# Patient Record
Sex: FEMALE | Race: WHITE | Employment: FULL TIME | URBAN - METROPOLITAN AREA
[De-identification: names, ages, dates, MRNs, and addresses within clinical notes are randomized per-mention and may not be internally consistent; named-entity substitution may affect disease eponyms.]

---

## 2018-03-15 ENCOUNTER — HOSPITAL ENCOUNTER (OUTPATIENT)
Dept: MAMMOGRAPHY | Age: 58
Discharge: HOME | End: 2018-03-15
Attending: OBSTETRICS & GYNECOLOGY | Admitting: OBSTETRICS & GYNECOLOGY

## 2018-03-15 DIAGNOSIS — Z12.31 VISIT FOR SCREENING MAMMOGRAM: ICD-10-CM

## 2018-03-23 ENCOUNTER — HOSPITAL ENCOUNTER (OUTPATIENT)
Dept: MAMMOGRAPHY | Age: 58
Discharge: HOME | End: 2018-03-23
Admitting: OBSTETRICS & GYNECOLOGY

## 2018-03-23 DIAGNOSIS — R92.8 ABNORMAL MAMMOGRAM: ICD-10-CM

## 2019-05-29 ENCOUNTER — HOSPITAL ENCOUNTER (OUTPATIENT)
Dept: WOMENS IMAGING | Age: 59
Discharge: HOME | End: 2019-05-29
Payer: COMMERCIAL

## 2019-05-29 DIAGNOSIS — Z12.39 BREAST CANCER SCREENING: ICD-10-CM

## 2019-05-29 PROCEDURE — 77067 SCR MAMMO BI INCL CAD: CPT

## 2021-04-07 ENCOUNTER — IMMUNIZATION (OUTPATIENT)
Dept: PRIMARY CARE CLINIC | Age: 61
End: 2021-04-07
Payer: COMMERCIAL

## 2021-04-07 PROCEDURE — 91300 COVID-19, PFIZER VACCINE 30MCG/0.3ML DOSE: CPT | Performed by: FAMILY MEDICINE

## 2021-04-07 PROCEDURE — 0001A COVID-19, PFIZER VACCINE 30MCG/0.3ML DOSE: CPT | Performed by: FAMILY MEDICINE

## 2021-04-28 ENCOUNTER — IMMUNIZATION (OUTPATIENT)
Dept: PRIMARY CARE CLINIC | Age: 61
End: 2021-04-28
Payer: COMMERCIAL

## 2021-04-28 PROCEDURE — 91300 COVID-19, PFIZER VACCINE 30MCG/0.3ML DOSE: CPT | Performed by: FAMILY MEDICINE

## 2021-04-28 PROCEDURE — 0002A COVID-19, PFIZER VACCINE 30MCG/0.3ML DOSE: CPT | Performed by: FAMILY MEDICINE

## 2021-12-27 ENCOUNTER — HOSPITAL ENCOUNTER (OUTPATIENT)
Dept: WOMENS IMAGING | Age: 61
Discharge: HOME | End: 2021-12-27
Payer: COMMERCIAL

## 2021-12-27 VITALS — BODY MASS INDEX: 23.7 KG/M2 | HEIGHT: 69 IN | WEIGHT: 160 LBS

## 2021-12-27 DIAGNOSIS — Z12.31 VISIT FOR SCREENING MAMMOGRAM: ICD-10-CM

## 2021-12-27 PROCEDURE — 77067 SCR MAMMO BI INCL CAD: CPT

## 2022-05-14 ENCOUNTER — HOSPITAL ENCOUNTER (OUTPATIENT)
Dept: NON INVASIVE DIAGNOSTICS | Age: 62
Discharge: HOME | End: 2022-05-14
Payer: COMMERCIAL

## 2022-05-14 DIAGNOSIS — I31.39 PERICARDIAL EFFUSION (NONINFLAMMATORY): ICD-10-CM

## 2022-05-14 LAB
LV EF: 55 %
LVEF MODALITY: NORMAL

## 2022-05-14 PROCEDURE — 93306 TTE W/DOPPLER COMPLETE: CPT

## 2022-05-17 ENCOUNTER — HOSPITAL ENCOUNTER (OUTPATIENT)
Dept: ULTRASOUND IMAGING | Age: 62
Discharge: HOME | End: 2022-05-17
Payer: COMMERCIAL

## 2022-05-17 DIAGNOSIS — R16.0 LIVER MASS: ICD-10-CM

## 2022-05-17 PROCEDURE — 76705 ECHO EXAM OF ABDOMEN: CPT

## 2022-12-08 ENCOUNTER — APPOINTMENT (OUTPATIENT)
Dept: PHYSICAL THERAPY | Age: 62
End: 2022-12-08
Payer: COMMERCIAL

## 2022-12-20 ENCOUNTER — HOSPITAL ENCOUNTER (OUTPATIENT)
Dept: PHYSICAL THERAPY | Age: 62
Setting detail: THERAPIES SERIES
Discharge: HOME | End: 2022-12-20
Payer: COMMERCIAL

## 2022-12-20 PROCEDURE — 97161 PT EVAL LOW COMPLEX 20 MIN: CPT

## 2022-12-20 PROCEDURE — 97530 THERAPEUTIC ACTIVITIES: CPT

## 2022-12-20 RX ORDER — LEVOTHYROXINE SODIUM 0.03 MG/1
25 TABLET ORAL DAILY
COMMUNITY

## 2022-12-20 NOTE — FLOWSHEET NOTE
[facility], [facility],  Elana Castellanos, [address]  Phone: [phone]  Fax [phone]      Physical Therapy Daily Treatment Note    Date:  2022    Patient Name:  Matteo Jones    :  1960  MRN: [id number]    Restrictions/Precautions:  universal   Allergies: Patient has no known allergies. Preferred Language for Healthcare:   [x] English       [] other:    Medical Diagnosis Information:  Diagnosis: M79.18 (ICD-10-CM) - Myalgia, other site  N94.10 (ICD-10-CM) - Unspecified dyspareunia  Treatment Diagnosis:pelvic floor hyperactivity                                          Insurance/Certification information:   [employer]na Medicare   Physician Information:  Bora Perez MD  Plan of care signed (Y/N):  N    Visit# / total visits:         Functional Outcome (if applicable): FOTO:54/300 (Lower score = Better function)     Medicare Cap (if applicable):  N/a= total amount used, updated 2022    Time in:   4:15pm      Timed Treatment: 45min Total Treatment Time:  60min  ________________________________________________________________________________________    Pain Level:    /10  SUBJECTIVE:  see eval    OBJECTIVE:     Exercise (TE) Resistance/Repetitions Other comments            PF strengthening                                  PF relaxation                                   Other Treatment:   TA:  Bladder re-training/education:     Bladder Diary: patient educated on importance of filling out bladder diary at home, complete with fluid intake, voids, and leakage when applicable. Voiding: patient educated on normal voiding and urinary cycle and the physiology of bladder control muscles and pelvic floor. The patient was educated on bladder and bowel dysfunction. The patient was also educated on hyperactivity  and it's affect on urination/defecation and pain.     Dietary:   Other: General education regarding anatomy and physiology of pelvic floor and supporting structures    Manual Treatments:    PERFECT, myofacial release, tapping to improve contraction      Modalities:  --    Test/Measurements:  see eval             ASSESSMENT:   see eval      Treatment/Activity Tolerance:   [x] Patient tolerated treatment well [] Patient limited by fatique  [] Patient limited by pain [] Patient limited by other medical complications  [] Other:     Goals:      Patient stated goal: \"normal sexual relations\"  [] Progressing: [] Met: [] Not Met: [] Adjusted        Therapist goals for Patient:   Short Term Goals: To be achieved in: 2 weeks  1. Independent in HEP and progression per patient tolerance, in order to prevent future occurrence of presenting issue. [] Progressing: [] Met: [] Not Met: [] Adjusted  2. Patient will have a 25% decrease in pain to indicate improvement in pelvic floor strength and relaxation, muscle coordination, and/or bladder retraining. [] Progressing: [] Met: [] Not Met: [] Adjusted     Long Term Goals: To be achieved in: 12 weeks  1. Disability index score of 25 or less for the PFDI-20 to assist with reaching prior level of function. [] Progressing: [] Met: [] Not Met: [] Adjusted  2. Patient will report ability to tolerate penetration without increase in pain to progress towards intercourse / examinations without pain or limitation. [] Progressing: [] Met: [] Not Met: [] Adjusted  3. Patient will increase PERF score to 4/5 with endurance of 10 seconds to demonstrate increased strength and control over pelvic floor musculature. [] Progressing: [] Met: [] Not Met: [] Adjusted  4. Patient will report 1 week or greater without s/s of constipation to progress towards completing ADLs and recreational activities without limitations.   [] Progressing: [] Met: [] Not Met: [] Adjusted          Plan: [x] Continue per plan of care [] Alter current plan (see comments)   [] Plan of care initiated [] Hold pending MD visit [] Discharge      Plan for Next Session:  review diary, TE, TA, manual PRN    Re-Certification Due Date:     visit 12    Signature:  Zofia Ko, PT, DPT

## 2022-12-20 NOTE — PROGRESS NOTES
[facility] Therapy, [facility], [address], [address]  Phone: [phone]  Fax [phone]                                                        Physical Therapy Certification    Dear Leodan Lara MD,    We had the pleasure of evaluating the following patient for physical therapy services at [address]. A summary of our findings can be found in the initial assessment below. This includes our plan of care. If you have any questions or concerns regarding these findings, please do not hesitate to contact me at the office phone number checked above. Thank you for the referral.       Physician Signature:_______________________________Date:__________________  By signing above (or electronic signature), therapist's plan is approved by physician      Patient: Sabina Hooks   : 1960   MRN: [id number]  Referring Physician: Leodan Lara MD      Evaluation Date: 2022      Medical Diagnosis Information:  Diagnosis: M79.18 (ICD-10-CM) - Myalgia, other site  N94.10 (ICD-10-CM) - Unspecified dyspareunia      Treatment Diagnosis:   pelvic floor hyperactivity                                        Insurance information:  A[employer]na Medicare     Chaperone offered for pelvic examination? [] No    [x] Yes  Chaperone requested for examination:  [x] No    [] Yes   If yes, who was present:    Precautions/ Contra-indications: universal     Latex Allergy:  [x]NO      []YES  Allergies: Patient has no known allergies. Preferred Language for Healthcare:   [x]English       []Other:    C-SSRS Triggered by Intake questionnaire (Past 2 wk assessment ):   [x] No, Questionnaire did not trigger screening.   [] Yes, Patient intake triggered C-SSRS Screening     [] Completed, no further action required.    [] Completed, PCP notified via Epic    Functional Outcome:   FOTO:54/300 (Lower score = Better function) SUBJECTIVE: Patient reports \"I have had painful intercourse/examinations for a while now\". Reports she has lichens sclerosis and has been using medications and feels that may have thinned the tissue some. Reports she also has some issues with emptying her bladder. Reports she does not have any urinary or fecal incontinence. Reports she does have some constipation. Reports \"I feel like it is a mental issue and physical issue\". Reports \"I feel still and I am not as stretched out as I used to be, in my legs and all of that\". Reports her pain has gradually gotten worse. Reports she has had pain for the last 5 years, but in the last 2-3 years she has had increased pain and problems. Reports she has dryness and tearing feeling in the vagina. Reports she has pain deep and \"feels like he is hitting a wall\". Has not had a hysterectomy. No history of sexual abuse or trauma. Urinary frequency? Daytime? YES Nighttime? YES  Bowel problems? Some constipation  Urinary or bowel leakage? denies    Pregnancy:   # of pregnancies: 1   # of deliveries: 1 vaginal    Episiotomy: yes   Normal post-osei healing? yes       4 week or greater of failed trial of PFPT program?   [x] No    [] Yes    PFPT program as defined by \"Completing 4 weeks of an ordered plan of pelvic muscle exercises designed to increase periurethral muscle strength\".     Relevant Medical History: see medical chart, reviewed and updated with patient    Pain Scale: 0-6/10  Easing factors: rest  Provocative factors: intercourse, examinations  Type: []Constant   [x]Intermittent  []Radiating []Localized []other:     Numbness/Tingling: denies    Occupation/School: Full Time       Living Status/Prior Level of Function: Prior to this injury / incident, pt was independent with ADLs and IADLs    OBJECTIVE:    Observation:   Posture: WFL   Gait analysis: WFL  Lumbar Mobility: WFL  Scar Location/Mobility: n/a  Diastasis Recti (in finger width): none noted    Special Tests:  Sacroiliac Test:   Gillet: positive (B)  Gaenslen: negative   Lumbar Spine:   Slump test: negative (B)    Neuro:   Sensation: (B) UEs:intact to light touch   Sensation: (B) LEs: intact to light touch   Anal Sensation:    S5: intact to light touch    S4: intact to light touch    S3:intact to light touch   Reflexes:     Anal: present    Cough: present      Pelvic Floor Exam  External:  Skin Condition: normal    Sensation: intact   Palpation: no point tenderness noted  Tone: normal   Perineal Body Mobility:    Voluntary PFM Contraction: present (normal)   Voluntary PFM Relaxation: present (normal)   Involuntary PFM Contraction/Cough Reflex: present (normal)   Involuntary PFM Relaxation: present (normal)  Perineal Body Descent:   Rest: supported   Bearing down: present (caudal) - ABNORMAL    Internal:  Sensation: intact   Palpation: (B) LA hyperactivity and moderate pain  Vaginal Vault Size: slightly decreased due to hyperactivity   PERFECT:   Power: 3+/5   Endurance: 8sec. Repetitions: 5   Fast Twitch: 5   Elevation: present   Co-contraction: present   Timing:present    Pelvic Organ Prolapse: none noted                         [x] Patient history, allergies, meds reviewed. Medical chart reviewed. See intake form. Review Of Systems (ROS):  [x]Performed Review of systems (Integumentary, CardioPulmonary, Neurological) by intake and observation. Intake form has been scanned into medical record. Patient has been instructed to contact their primary care physician regarding ROS issues if not already being addressed at this time.       Co-morbidities/Complexities (which will affect course of rehabilitation):   [x]None        []Hx of COVID   Arthritic conditions   []Rheumatoid arthritis (M05.9)  []Osteoarthritis (M19.91)  []Gout   Cardiovascular conditions   []Hypertension (I10)  []Hyperlipidemia (E78.5)  []Angina pectoris (I20)  []Atherosclerosis (I70)  []Pacemaker  []Hx of CABG/stent/  cardiac surgeries   Musculoskeletal conditions   []Disc pathology   []Congenital spine pathologies   []Osteoporosis (M81.8)  []Osteopenia (M85.8)  []Scoliosis       Endocrine conditions   []Hypothyroid (E03.9)  []Hyperthyroid Gastrointestinal conditions   []Constipation (I16.40)   Metabolic conditions   []Morbid obesity (E66.01)  []Diabetes type 1(E10.65) or 2 (E11.65)   []Neuropathy (G60.9)     Cardio/Pulmonary conditions   []Asthma (J45)  []Coughing   []COPD (J44.9)  []CHF  []A-fib   Psychological Disorders  []Anxiety (F41.9)  []Depression (F32.9)   []Other:   Developmental Disorders  []Autism (F84.0)  []CP (G80)  []Down Syndrome (Q90.9)  []Developmental delay     Neurological conditions  []Prior Stroke (I69.30)  []Parkinson's (G20)  []Encephalopathy (G93.40)  []MS (G35)  []Post-polio (G14)  []SCI  []TBI  []ALS Other conditions  []Fibromyalgia (M79.7)  []Vertigo  []Syncope  []Kidney Failure  []Cancer      []currently undergoing                treatment  []Pregnancy  []Incontinence   Prior surgeries  []involved limb  []previous spinal surgery  [] section birth  []hysterectomy  []bowel / bladder surgery  []other relevant surgeries   []Other:              Barriers to/and or personal factors that will affect rehab potential: NONE                    Falls Risk Assessment (30 days):   [x] Falls Risk assessed and no intervention required. [] Falls Risk assessed and Patient requires intervention due to being higher risk   TUG score (>12s at risk):     [] Falls education provided, including         ASSESSMENT: The patient is a 64yo female referred to PFPT due to M79.18 (ICD-10-CM) - Myalgia, other site  N94.10 (ICD-10-CM) - Unspecified dyspareunia. The patient presented with decreased pelvic floor strength, endurance and overall coordination. The patient presented with hyperactivity of pelvic floor as well. The patient seems very motivated and should improve with PT and HEP compliance.     Functional Impairments:    []Noted lumbar/proximal hip hypomobility  []Noted lumbosacral and/or generalized hypermobility  []Decreased core/proximal hip strength and neuromuscular control   []Decreased LE functional strength  []pelvic/sacral/spinal malalignment   []Increased pain with penetration  []Absent/poor control of PF contraction   [x]Absent/poor control of PF relaxation  []Decreased control of bladder  []Decreased control of bowel    Functional Activity Limitations (from functional questionnaire and intake)  []Reduced ability to maintain good posture and demonstrate good body mechanics with sitting, bending, and lifting  []Reduced ability to perform lifting, reaching, carrying tasks  []Reduced ability to control urine  []Reduced ability to control bowel movements   []Reduced ability to ambulate prolonged functional periods/distances/surfaces  []Reduced ability to squat   []Reduced ability to forward bend  []Reduced ability to ascend/descend stairs  [x]Reduced ability to tolerate penetration/intercourse    Participation Restrictions  [x]Reduced participation in self care activities  [x]Reduced participation in home management activities  [x]Reduced participation in work activities  [x]Reduced participation in social activities  [x]Reduced participation in sport/recreational activities    Classification/Subgrouping:  [x]signs/symptoms consistent vaginismus/dyspareunia    []signs/symptoms consistent with pelvic floor organ prolapse  []signs/symptoms consistent with stress urinary incontinence  []signs/symptoms consistent with urge urinary incontinence  []signs/symptoms consistent with bowel incontinence  [x]signs/symptoms consistent with decreased ROM, strength and function  []signs/symptoms consistent with other:       Prognosis/Rehab Potential:      []Excellent   [x]Good    []Fair   []Poor    Tolerance of evaluation/treatment:    []Excellent   [x]Good    []Fair   []Poor    Physical Therapy Evaluation Complexity Justification  [x] A history of present problem with:  [x] no personal factors and/or comorbidities that impact the plan of care;  []1-2 personal factors and/or comorbidities that impact the plan of care  []3 personal factors and/or comorbidities that impact the plan of care  [x] An examination of body systems using standardized tests and measures addressing any of the following: body structures and functions (impairments), activity limitations, and/or participation restrictions;:  [x] a total of 1-2 or more elements   [] a total of 3 or more elements   [] a total of 4 or more elements   [x] A clinical presentation with:  [x] stable and/or uncomplicated characteristics   [] evolving clinical presentation with changing characteristics  [] unstable and unpredictable characteristics;   [x] Clinical decision making of [x] low, [] moderate, [] high complexity using standardized patient assessment instrument and/or measurable assessment of functional outcome. [x] EVAL (LOW) 16926 (typically 20 minutes face-to-face)  [] EVAL (MOD) 24986 (typically 30 minutes face-to-face)  [] EVAL (HIGH) 74388 (typically 45 minutes face-to-face)  [] RE-EVAL       PLAN:   Frequency/Duration:  1 day per week for 12 Weeks:  Interventions:  [x]  Therapeutic exercise including: strength training, ROM, and functional mobility for joint, spine, core, and pelvic floor   [x]  NMR activation and proprioception for abdominals, pelvic floor musculature activation and coordination, and posture retraining   [x]  Manual therapy as indicated for spine, ribs, soft tissue, and pelvic floor to include: IASTM with or without dilator, STM, PROM, Gr I-IV mobilizations   [x] Modalities as needed that may include: E-stim, Biofeedback as indicated  [x] Patient education on pelvic floor anatomy and function, bladder and bowel anatomy and function, joint protection, postural re-education, activity modification, progression of HEP.      HEP instruction: Written HEP instructions provided and reviewed    GOALS:  Patient stated goal: \"normal sexual relations\"  [] Progressing: [] Met: [] Not Met: [] Adjusted      Therapist goals for Patient:   Short Term Goals: To be achieved in: 2 weeks  1. Independent in HEP and progression per patient tolerance, in order to prevent future occurrence of presenting issue. [] Progressing: [] Met: [] Not Met: [] Adjusted  2. Patient will have a 25% decrease in pain to indicate improvement in pelvic floor strength and relaxation, muscle coordination, and/or bladder retraining. [] Progressing: [] Met: [] Not Met: [] Adjusted    Long Term Goals: To be achieved in: 12 weeks  1. Disability index score of 25 or less for the PFDI-20 to assist with reaching prior level of function. [] Progressing: [] Met: [] Not Met: [] Adjusted  2. Patient will report ability to tolerate penetration without increase in pain to progress towards intercourse / examinations without pain or limitation. [] Progressing: [] Met: [] Not Met: [] Adjusted  3. Patient will increase PERF score to 4/5 with endurance of 10 seconds to demonstrate increased strength and control over pelvic floor musculature. [] Progressing: [] Met: [] Not Met: [] Adjusted  4. Patient will report 1 week or greater without s/s of constipation to progress towards completing ADLs and recreational activities without limitations.   [] Progressing: [] Met: [] Not Met: [] Adjusted      Electronically signed by:  Shari Luevano, PT, DPT

## 2023-01-12 ENCOUNTER — HOSPITAL ENCOUNTER (OUTPATIENT)
Dept: PHYSICAL THERAPY | Age: 63
Setting detail: THERAPIES SERIES
Discharge: HOME | End: 2023-01-12
Payer: COMMERCIAL

## 2023-01-12 PROCEDURE — 97530 THERAPEUTIC ACTIVITIES: CPT

## 2023-01-12 PROCEDURE — 97110 THERAPEUTIC EXERCISES: CPT

## 2023-01-12 NOTE — FLOWSHEET NOTE
[facility], [facility], [address], [address]  Phone: [phone]  Fax [phone]      Physical Therapy Daily Treatment Note    Date:  2023    Patient Name:  Gina Connolly    :  1960  MRN: [id number]    Restrictions/Precautions:  universal   Allergies: Patient has no known allergies. Preferred Language for Healthcare:   [x] English       [] other:    Medical Diagnosis Information:  Diagnosis: M79.18 (ICD-10-CM) - Myalgia, other site  N94.10 (ICD-10-CM) - Unspecified dyspareunia  Treatment Diagnosis:pelvic floor hyperactivity                                          Insurance/Certification information:   A[employer]na Medicare   Physician Information:  Chacho Zhu MD  Plan of care signed (Y/N):  Y    Visit# / total visits:         Functional Outcome (if applicable): FOTO:54/300 (Lower score = Better function)     Medicare Cap (if applicable):  N/a= total amount used, updated 2023    Time in:   8:15am      Timed Treatment: 45min Total Treatment Time:  45min  ________________________________________________________________________________________    Pain Level:    0/10  SUBJECTIVE:  Patient reports \"I had a weird pain on my left side that was kind of crampy\". Reports she just started using the estrogen on Friday (6 days ago). Reports compliance with HEP. OBJECTIVE:     Exercise (TE) Resistance/Repetitions Other comments            PF strengthening                                  PF relaxation Belly breathing:x10         Hip ADD stretch:x10         Happy Baby Pose:x10           Cat to Cow: x10              Other Treatment:   TA:  Bladder re-training/education:     Bladder Diary: 8-9 day voids, 1 night void, 0 leaks, 1-2BM/day    Voiding: educated to void every 2-3 hours.      Dietary: patient educated on importance of fiber; goal is 20-25g/day    Other: Dilator Education (handout issued)    Manual Treatments:     Modalities:  --    Test/Measurements:  see eval             ASSESSMENT:  Patient responded well to above session. Adapted nicely to added TE this date. Handouts issued on dilators, diet, and TE. Patient will work on above program and follow up in 2 weeks for manual therapy and to progress as able. Treatment/Activity Tolerance:   [x] Patient tolerated treatment well [] Patient limited by fatique  [] Patient limited by pain [] Patient limited by other medical complications  [] Other:     Goals:      Patient stated goal: \"normal sexual relations\"  [] Progressing: [] Met: [] Not Met: [] Adjusted        Therapist goals for Patient:   Short Term Goals: To be achieved in: 2 weeks  1. Independent in HEP and progression per patient tolerance, in order to prevent future occurrence of presenting issue. [] Progressing: [] Met: [] Not Met: [] Adjusted  2. Patient will have a 25% decrease in pain to indicate improvement in pelvic floor strength and relaxation, muscle coordination, and/or bladder retraining. [] Progressing: [] Met: [] Not Met: [] Adjusted     Long Term Goals: To be achieved in: 12 weeks  1. Disability index score of 25 or less for the PFDI-20 to assist with reaching prior level of function. [] Progressing: [] Met: [] Not Met: [] Adjusted  2. Patient will report ability to tolerate penetration without increase in pain to progress towards intercourse / examinations without pain or limitation. [] Progressing: [] Met: [] Not Met: [] Adjusted  3. Patient will increase PERF score to 4/5 with endurance of 10 seconds to demonstrate increased strength and control over pelvic floor musculature. [] Progressing: [] Met: [] Not Met: [] Adjusted  4. Patient will report 1 week or greater without s/s of constipation to progress towards completing ADLs and recreational activities without limitations.   [] Progressing: [] Met: [] Not Met: [] Adjusted          Plan: [x] Continue per plan of care [] Alter current plan (see comments)   [] Plan of care initiated [] Hold pending MD visit [] Discharge      Plan for Next Session:  TE, TA, manual PRN    Re-Certification Due Date:     visit 12    Signature:  Mile Pollock PT, DPT

## 2023-02-02 ENCOUNTER — HOSPITAL ENCOUNTER (OUTPATIENT)
Dept: PHYSICAL THERAPY | Age: 63
Setting detail: THERAPIES SERIES
Discharge: HOME | End: 2023-02-02
Payer: COMMERCIAL

## 2023-02-02 PROCEDURE — 97140 MANUAL THERAPY 1/> REGIONS: CPT

## 2023-02-02 PROCEDURE — 97530 THERAPEUTIC ACTIVITIES: CPT

## 2023-02-02 PROCEDURE — 97110 THERAPEUTIC EXERCISES: CPT

## 2023-02-02 NOTE — FLOWSHEET NOTE
[facility], [facility], [address], [address]  Phone: [phone]  Fax [phone]      Physical Therapy Daily Treatment Note and Reassessment Note    Date:  2023    Patient Name:  Mariusz Cabello    :  1960  MRN: [id number]    Restrictions/Precautions:  universal   Allergies: Patient has no known allergies. Preferred Language for Healthcare:   [x] English       [] other:    Medical Diagnosis Information:  Diagnosis: M79.18 (ICD-10-CM) - Myalgia, other site  N94.10 (ICD-10-CM) - Unspecified dyspareunia  Treatment Diagnosis:pelvic floor hyperactivity                                          Insurance/Certification information:   [employer]   Physician Information:  Nimisha aPyan MD  Plan of care signed (Y/N):  Y    Visit# / total visits:  3/12       Functional Outcome (if applicable): FOTO:54/300 (Lower score = Better function)     Medicare Cap (if applicable):  N/a= total amount used, updated 2023    Time in:   4:15pm      Timed Treatment: 40min Total Treatment Time:  40min  ________________________________________________________________________________________    Pain Level:    0/10  SUBJECTIVE:  Patient reports \"I think things are going pretty good, I am on the largest one 2 times\". Has been using the estrogen for the past 4 weeks. Reports compliance with HEP. OBJECTIVE:     Exercise (TE) Resistance/Repetitions Other comments            PF strengthening                                  PF relaxation Belly breathing:x10         Hip ADD stretch:x10         Happy Baby Pose:x10           Cat to Cow: x10              Other Treatment:   TA:  Bladder re-training/education:     Bladder Diary: 8-9 day voids, 1 night void, 0 leaks, 1-2BM/day    Voiding: educated to void every 2-3 hours.      Dietary: patient educated on importance of fiber; goal is 20-25g/day    Other: Dilator Education (progression and using for \"warm up\")    Manual Treatments:   PERFECT, myofacial release      Modalities:  --    Test/Measurements:    Internal:  Sensation: intact   Palpation: none noted (was (B) LA hyperactivity and moderate pain)  Vaginal Vault Size: normal (was slightly decreased due to hyperactivity)  PERFECT:              Power: 3+/5              Endurance: 10sec. (was 8sec.)              Repetitions: 10 (was 5)              Fast Twitch: 10 (was 5)              Elevation: present              Co-contraction: present              Timing:present     Pelvic Organ Prolapse: none noted           ASSESSMENT:  The patient has made great progress over the last 3 visits. Patient responded well to above session. The patient plans to work on things on own for a few weeks and follow up with PT if further problems arise. If patient does not return, let this note stand as discharge summary     Treatment/Activity Tolerance:   [x] Patient tolerated treatment well [] Patient limited by fatique  [] Patient limited by pain [] Patient limited by other medical complications  [] Other:     Goals:      Patient stated goal: \"normal sexual relations\"  [x] Progressing: [] Met: [] Not Met: [] Adjusted        Therapist goals for Patient:   Short Term Goals: To be achieved in: 2 weeks  1. Independent in HEP and progression per patient tolerance, in order to prevent future occurrence of presenting issue. [] Progressing: [x] Met: [] Not Met: [] Adjusted  2. Patient will have a 25% decrease in pain to indicate improvement in pelvic floor strength and relaxation, muscle coordination, and/or bladder retraining. [] Progressing: [x] Met: [] Not Met: [] Adjusted     Long Term Goals: To be achieved in: 12 weeks  1. Disability index score of 25 or less for the PFDI-20 to assist with reaching prior level of function. [] Progressing: [x] Met: [] Not Met: [] Adjusted  2.  Patient will report ability to tolerate penetration without increase in pain to progress towards intercourse / examinations without pain or limitation. [] Progressing: [x] Met: [] Not Met: [] Adjusted  3. Patient will increase PERF score to 4/5 with endurance of 10 seconds to demonstrate increased strength and control over pelvic floor musculature. [] Progressing: [x] Met: [] Not Met: [] Adjusted  4. Patient will report 1 week or greater without s/s of constipation to progress towards completing ADLs and recreational activities without limitations.   [] Progressing: [x] Met: [] Not Met: [] Adjusted          Plan: [x] Continue per plan of care [] Alter current plan (see comments)   [] Plan of care initiated [] Hold pending MD visit [] Discharge      Plan for Next Session:  TE, TA, manual PRN    Re-Certification Due Date:     visit 12    Signature:  Eliseo Hi PT, DPT

## 2023-03-31 ENCOUNTER — HOSPITAL ENCOUNTER (OUTPATIENT)
Age: 63
Discharge: HOME | End: 2023-03-31
Payer: COMMERCIAL

## 2023-03-31 ENCOUNTER — HOSPITAL ENCOUNTER (OUTPATIENT)
Dept: GENERAL RADIOLOGY | Age: 63
Discharge: HOME | End: 2023-03-31
Payer: COMMERCIAL

## 2023-03-31 ENCOUNTER — HOSPITAL ENCOUNTER (OUTPATIENT)
Dept: WOMENS IMAGING | Age: 63
Discharge: HOME | End: 2023-03-31
Payer: COMMERCIAL

## 2023-03-31 ENCOUNTER — HOSPITAL ENCOUNTER (OUTPATIENT)
Age: 63
End: 2023-03-31
Payer: COMMERCIAL

## 2023-03-31 DIAGNOSIS — Z12.31 SCREENING MAMMOGRAM FOR BREAST CANCER: ICD-10-CM

## 2023-03-31 DIAGNOSIS — M17.10 ARTHRITIS OF KNEE: ICD-10-CM

## 2023-03-31 PROCEDURE — 73565 X-RAY EXAM OF KNEES: CPT

## 2023-03-31 PROCEDURE — 77063 BREAST TOMOSYNTHESIS BI: CPT
